# Patient Record
Sex: MALE | Race: WHITE | Employment: UNEMPLOYED | URBAN - METROPOLITAN AREA
[De-identification: names, ages, dates, MRNs, and addresses within clinical notes are randomized per-mention and may not be internally consistent; named-entity substitution may affect disease eponyms.]

---

## 2017-12-13 ENCOUNTER — MED REC SCAN ONLY (OUTPATIENT)
Dept: INTERNAL MEDICINE CLINIC | Facility: CLINIC | Age: 57
End: 2017-12-13

## 2018-03-17 ENCOUNTER — APPOINTMENT (OUTPATIENT)
Dept: CT IMAGING | Facility: HOSPITAL | Age: 58
End: 2018-03-17
Attending: EMERGENCY MEDICINE
Payer: COMMERCIAL

## 2018-03-17 PROCEDURE — 70450 CT HEAD/BRAIN W/O DYE: CPT | Performed by: EMERGENCY MEDICINE

## 2018-03-17 PROCEDURE — 72125 CT NECK SPINE W/O DYE: CPT | Performed by: EMERGENCY MEDICINE

## 2018-03-18 NOTE — ED INITIAL ASSESSMENT (HPI)
Pt brought in by EMS for unwitnessed fall, unknown LOC, No blood thinners.  Wife report pt hit head during fall, +ETOH

## 2018-03-18 NOTE — ED NOTES
Patient to CT accompanied by x2 ERT and Security for ETOH. Pt cleared off backboard by Dr. Bebo Wasserman. C-Collar remains in place.

## 2018-03-18 NOTE — ED PROVIDER NOTES
Patient Seen in: [facility] AND [facility] Emergency Department    History   Patient presents with:  Fall (musculoskeletal, neurologic)  Alcohol Intoxication (neurologic)    Stated Complaint:     HPI    56year old male otherwise healthy who presents with blunt Constitutional: He appears well-developed and well-nourished. He is uncooperative. Non-toxic appearance. No distress. Cervical collar and backboard in place.    Patient arrived via EMS backboard, c-collar, he is trying to take the c-collar off, he is nonco , sinus, normal for rate and rhythm     Radiology findings:     CT HEAD AND CERVICAL SPINE WITHOUT CONTRAST    IMPRESSION  1.  Mild to moderate left parietal scalp soft tissue swelling with possible abrasion or laceration, but no calvarial fracture or defin Lan  [phone]    Schedule an appointment as soon as possible for a visit in 2 days          Medications Prescribed:  There are no discharge medications for this patient.

## 2018-03-18 NOTE — ED NOTES
Pt safe to d/c home per MD, able to dress self. D/C teaching completed, pt verbalizes understanding,pt wheeled to exit accompanied by wife.

## 2018-09-07 ENCOUNTER — APPOINTMENT (OUTPATIENT)
Dept: GENERAL RADIOLOGY | Age: 58
End: 2018-09-07
Attending: NURSE PRACTITIONER
Payer: COMMERCIAL

## 2018-09-07 ENCOUNTER — HOSPITAL ENCOUNTER (OUTPATIENT)
Age: 58
Discharge: HOME | End: 2018-09-07
Payer: COMMERCIAL

## 2018-09-07 VITALS
SYSTOLIC BLOOD PRESSURE: 140 MMHG | WEIGHT: 183 LBS | HEIGHT: 71 IN | OXYGEN SATURATION: 98 % | RESPIRATION RATE: 20 BRPM | BODY MASS INDEX: 25.62 KG/M2 | DIASTOLIC BLOOD PRESSURE: 74 MMHG | HEART RATE: 83 BPM | TEMPERATURE: 99 F

## 2018-09-07 DIAGNOSIS — S93.601A SPRAIN OF RIGHT FOOT, INITIAL ENCOUNTER: Primary | ICD-10-CM

## 2018-09-07 DIAGNOSIS — S83.91XA SPRAIN OF RIGHT LOWER LEG, INITIAL ENCOUNTER: ICD-10-CM

## 2018-09-07 PROCEDURE — 73590 X-RAY EXAM OF LOWER LEG: CPT | Performed by: NURSE PRACTITIONER

## 2018-09-07 PROCEDURE — 99214 OFFICE O/P EST MOD 30 MIN: CPT

## 2018-09-07 PROCEDURE — 99213 OFFICE O/P EST LOW 20 MIN: CPT

## 2018-09-07 PROCEDURE — 73630 X-RAY EXAM OF FOOT: CPT | Performed by: NURSE PRACTITIONER

## 2018-09-07 NOTE — ED INITIAL ASSESSMENT (HPI)
PATIENT ARRIVED PER WHEELCHAIR TO ROOM. PATIENT STATES HE \"FELL OUT OF BED\" WHILE SLEEPING 2 DAYS AGO. PATIENT C/O RIGHT FOOT PAIN. RIGHT CALF PAIN.

## 2018-09-07 NOTE — ED PROVIDER NOTES
Patient presents with:  Fall      HPI:     Eben Gore is a 66year old male who presents today with a chief complaint of pain in the right shin and right foot after a fall that occurred 2 days ago. Patient states he fell out of bed.   He states he Comment: coffee/soda - 2cups/day     Social History Narrative   None on file           ROS:   Positive for stated complaint: fall, leg and foot pain  All other systems reviewed and negative except as noted above. Constitutional and Vital Signs Reviewed. OUT OF               BED\" WHILE SLEEPING 2 DAYS AGO. PATIENT C/O RIGHT FOOT PAIN. RIGHT CALF               PAIN. Order Specific Question: What is the Relevant Clinical Indication / Reason for Exam?          Answer:  Fall    Labs per

## 2019-04-30 ENCOUNTER — HOSPITAL ENCOUNTER (OUTPATIENT)
Dept: GENERAL RADIOLOGY | Facility: HOSPITAL | Age: 59
Discharge: HOME | End: 2019-04-30
Attending: CHIROPRACTOR
Payer: COMMERCIAL

## 2019-04-30 DIAGNOSIS — M54.50 LUMBAR BACK PAIN: ICD-10-CM

## (undated) NOTE — ED AVS SNAPSHOT
Debi Naqvi   MRN: [id number]    Department:  [facility] Emergency Department   Date of Visit:  3/17/2018           Disclosure     Insurance plans vary and the physician(s) referred by the ER may not be covered by your plan.  Please conta CARE PHYSICIAN AT ONCE OR RETURN IMMEDIATELY TO THE EMERGENCY DEPARTMENT. If you have been prescribed any medication(s), please fill your prescription right away and begin taking the medication(s) as directed.   If you believe that any of the medications